# Patient Record
Sex: MALE | Race: WHITE | Employment: OTHER | URBAN - METROPOLITAN AREA
[De-identification: names, ages, dates, MRNs, and addresses within clinical notes are randomized per-mention and may not be internally consistent; named-entity substitution may affect disease eponyms.]

---

## 2020-01-01 ENCOUNTER — RECORDS - HEALTHEAST (OUTPATIENT)
Dept: LAB | Facility: CLINIC | Age: 85
End: 2020-01-01

## 2020-01-01 ENCOUNTER — TRANSFERRED RECORDS (OUTPATIENT)
Dept: HEALTH INFORMATION MANAGEMENT | Facility: CLINIC | Age: 85
End: 2020-01-01

## 2020-01-01 ENCOUNTER — DOCUMENTATION ONLY (OUTPATIENT)
Dept: OTHER | Facility: CLINIC | Age: 85
End: 2020-01-01

## 2020-01-01 ENCOUNTER — ASSISTED LIVING VISIT (OUTPATIENT)
Dept: GERIATRICS | Facility: CLINIC | Age: 85
End: 2020-01-01
Payer: COMMERCIAL

## 2020-01-01 VITALS
SYSTOLIC BLOOD PRESSURE: 165 MMHG | HEART RATE: 74 BPM | HEIGHT: 65 IN | RESPIRATION RATE: 17 BRPM | WEIGHT: 147 LBS | BODY MASS INDEX: 24.49 KG/M2 | DIASTOLIC BLOOD PRESSURE: 85 MMHG | TEMPERATURE: 98.4 F | OXYGEN SATURATION: 97 %

## 2020-01-01 VITALS
WEIGHT: 147 LBS | TEMPERATURE: 97.4 F | DIASTOLIC BLOOD PRESSURE: 85 MMHG | HEART RATE: 74 BPM | SYSTOLIC BLOOD PRESSURE: 165 MMHG | BODY MASS INDEX: 24.49 KG/M2 | RESPIRATION RATE: 17 BRPM | HEIGHT: 65 IN | OXYGEN SATURATION: 95 %

## 2020-01-01 VITALS
TEMPERATURE: 96.5 F | HEART RATE: 74 BPM | SYSTOLIC BLOOD PRESSURE: 165 MMHG | RESPIRATION RATE: 17 BRPM | DIASTOLIC BLOOD PRESSURE: 85 MMHG | OXYGEN SATURATION: 98 %

## 2020-01-01 DIAGNOSIS — C91.10 CHRONIC LYMPHOCYTIC LEUKEMIA (H): Primary | ICD-10-CM

## 2020-01-01 DIAGNOSIS — F02.818 LATE ONSET ALZHEIMER'S DISEASE WITH BEHAVIORAL DISTURBANCE (H): ICD-10-CM

## 2020-01-01 DIAGNOSIS — G30.1 LATE ONSET ALZHEIMER'S DISEASE WITH BEHAVIORAL DISTURBANCE (H): Primary | ICD-10-CM

## 2020-01-01 DIAGNOSIS — G30.1 LATE ONSET ALZHEIMER'S DISEASE WITH BEHAVIORAL DISTURBANCE (H): ICD-10-CM

## 2020-01-01 DIAGNOSIS — C91.10 CHRONIC LYMPHOCYTIC LEUKEMIA (H): ICD-10-CM

## 2020-01-01 DIAGNOSIS — N40.0 BENIGN PROSTATIC HYPERPLASIA WITHOUT LOWER URINARY TRACT SYMPTOMS: ICD-10-CM

## 2020-01-01 DIAGNOSIS — F02.818 LATE ONSET ALZHEIMER'S DISEASE WITH BEHAVIORAL DISTURBANCE (H): Primary | ICD-10-CM

## 2020-01-01 DIAGNOSIS — Z76.89 ENCOUNTER TO ESTABLISH CARE: Primary | ICD-10-CM

## 2020-01-01 DIAGNOSIS — R21 RASH: Primary | ICD-10-CM

## 2020-01-01 DIAGNOSIS — R53.81 PHYSICAL DECONDITIONING: ICD-10-CM

## 2020-01-01 DIAGNOSIS — N18.30 CKD (CHRONIC KIDNEY DISEASE) STAGE 3, GFR 30-59 ML/MIN (H): ICD-10-CM

## 2020-01-01 DIAGNOSIS — R52 PAIN: Primary | ICD-10-CM

## 2020-01-01 DIAGNOSIS — R91.1 PULMONARY NODULE: ICD-10-CM

## 2020-01-01 LAB
ALT SERPL-CCNC: 13 U/L (ref 7–40)
ANION GAP SERPL CALCULATED.3IONS-SCNC: 11 MMOL/L (ref 5–18)
ANION GAP SERPL CALCULATED.3IONS-SCNC: 11 MMOL/L (ref 5–18)
AST SERPL-CCNC: 18 U/L (ref 13–40)
BUN SERPL-MCNC: 25 MG/DL (ref 8–28)
BUN SERPL-MCNC: 25 MG/DL (ref 8–28)
CALCIUM SERPL-MCNC: 9 MG/DL (ref 8.5–10.5)
CALCIUM SERPL-MCNC: 9 MG/DL (ref 8.5–10.5)
CHLORIDE BLD-SCNC: 107 MMOL/L (ref 98–107)
CHLORIDE SERPLBLD-SCNC: 107 MMOL/L (ref 98–107)
CO2 SERPL-SCNC: 22 MMOL/L (ref 22–31)
CO2 SERPL-SCNC: 22 MMOL/L (ref 22–31)
CREAT SERPL-MCNC: 1.05 MG/DL (ref 0.7–1.3)
CREAT SERPL-MCNC: 1.05 MG/DL (ref 0.7–1.3)
CREAT SERPL-MCNC: 1.1 MG/DL (ref 0.5–1.2)
ERYTHROCYTE [DISTWIDTH] IN BLOOD BY AUTOMATED COUNT: 14.4 % (ref 11–14.5)
ERYTHROCYTE [DISTWIDTH] IN BLOOD BY AUTOMATED COUNT: 14.4 % (ref 11–14.5)
GFR SERPL CREATININE-BSD FRML MDRD: 59.8 ML/MIN/1.73M2
GFR SERPL CREATININE-BSD FRML MDRD: >60 ML/MIN/1.73M2
GFR SERPL CREATININE-BSD FRML MDRD: >60 ML/MIN/1.73M2
GLUCOSE BLD-MCNC: 115 MG/DL (ref 70–125)
GLUCOSE SERPL-MCNC: 115 MG/DL (ref 70–125)
GLUCOSE SERPL-MCNC: 148 MG/DL (ref 73–126)
HCT VFR BLD AUTO: 41.5 % (ref 40–54)
HCT VFR BLD AUTO: 41.5 % (ref 40–54)
HEMOGLOBIN: 13.3 G/DL (ref 14–18)
HGB BLD-MCNC: 13.3 G/DL (ref 14–18)
MCH RBC QN AUTO: 31.9 PG (ref 27–34)
MCH RBC QN AUTO: 31.9 PG (ref 27–34)
MCHC RBC AUTO-ENTMCNC: 32 G/DL (ref 32–36)
MCHC RBC AUTO-ENTMCNC: 32 G/DL (ref 32–36)
MCV RBC AUTO: 100 FL (ref 80–100)
MCV RBC AUTO: 100 FL (ref 80–100)
PLATELET # BLD AUTO: 181 THOU/UL (ref 140–440)
PLATELET # BLD AUTO: 181 THOU/UL (ref 140–440)
PMV BLD AUTO: 11.5 FL (ref 8.5–12.5)
POTASSIUM BLD-SCNC: 3.9 MMOL/L (ref 3.5–5)
POTASSIUM SERPL-SCNC: 3.8 MMOL/L (ref 3.5–5.1)
POTASSIUM SERPL-SCNC: 3.9 MMOL/L (ref 3.5–5)
RBC # BLD AUTO: 4.17 MILL/UL (ref 4.4–6.2)
RBC # BLD AUTO: 4.17 MILL/UL (ref 4.4–6.2)
SODIUM SERPL-SCNC: 140 MMOL/L (ref 136–145)
SODIUM SERPL-SCNC: 140 MMOL/L (ref 136–145)
WBC # BLD AUTO: 25.4 THOU/UL (ref 4–11)
WBC: 25.4 THOU/UL (ref 4–11)

## 2020-01-01 PROCEDURE — 99207 ZZC CDG-CODE CATEGORY CHANGED: CPT | Performed by: NURSE PRACTITIONER

## 2020-01-01 RX ORDER — CITALOPRAM HYDROBROMIDE 20 MG/1
TABLET ORAL
Qty: 28 TABLET | Refills: 97 | Status: SHIPPED | OUTPATIENT
Start: 2020-01-01

## 2020-01-01 RX ORDER — LORAZEPAM 0.5 MG/1
0.5 TABLET ORAL DAILY PRN
COMMUNITY
Start: 2020-01-01

## 2020-01-01 RX ORDER — DONEPEZIL HYDROCHLORIDE 10 MG/1
10 TABLET, FILM COATED ORAL DAILY
COMMUNITY
Start: 2020-01-01 | End: 2020-01-01

## 2020-01-01 RX ORDER — CITALOPRAM HYDROBROMIDE 20 MG/1
20 TABLET ORAL DAILY
COMMUNITY
Start: 2020-01-01 | End: 2020-01-01

## 2020-01-01 RX ORDER — ANORECTAL OINTMENT 15.7; .44; 24; 20.6 G/100G; G/100G; G/100G; G/100G
OINTMENT TOPICAL
Qty: 113 G | Refills: 97 | Status: SHIPPED | OUTPATIENT
Start: 2020-01-01

## 2020-01-01 RX ORDER — MEMANTINE HYDROCHLORIDE 10 MG/1
TABLET ORAL
Qty: 74 TABLET | Refills: 64 | Status: SHIPPED | OUTPATIENT
Start: 2020-01-01 | End: 2021-01-01

## 2020-01-01 RX ORDER — ACETAMINOPHEN 160 MG/5ML
1 SUSPENSION, ORAL (FINAL DOSE FORM) ORAL DAILY PRN
COMMUNITY
Start: 2020-01-01 | End: 2021-01-01

## 2020-01-01 RX ORDER — PSEUDOEPHED/ACETAMINOPH/DIPHEN 30MG-500MG
TABLET ORAL
Qty: 168 TABLET | Refills: 97 | Status: SHIPPED | OUTPATIENT
Start: 2020-01-01

## 2020-01-01 RX ORDER — DIMETHICONE 50 MG/G
CREAM TOPICAL
Qty: 114 G | Refills: 97 | Status: SHIPPED | OUTPATIENT
Start: 2020-01-01

## 2020-01-01 RX ORDER — MELOXICAM 7.5 MG/1
7.5 TABLET ORAL DAILY
COMMUNITY
Start: 2020-01-01 | End: 2020-01-01

## 2020-01-01 RX ORDER — DONEPEZIL HYDROCHLORIDE 10 MG/1
TABLET, FILM COATED ORAL
Qty: 28 TABLET | Refills: 97 | Status: SHIPPED | OUTPATIENT
Start: 2020-01-01 | End: 2021-01-01

## 2020-01-01 RX ORDER — QUETIAPINE FUMARATE 25 MG/1
TABLET, FILM COATED ORAL
Qty: 72 TABLET | Status: SHIPPED | OUTPATIENT
Start: 2020-01-01

## 2020-01-01 RX ORDER — MEMANTINE HYDROCHLORIDE 10 MG/1
10 TABLET ORAL 2 TIMES DAILY
COMMUNITY
Start: 2020-01-01 | End: 2020-01-01

## 2020-01-01 RX ORDER — VITAMIN B COMPLEX
2 TABLET ORAL DAILY
COMMUNITY
Start: 2020-01-01 | End: 2021-01-01

## 2020-01-01 RX ORDER — ACETAMINOPHEN 500 MG
500 TABLET ORAL EVERY 6 HOURS
COMMUNITY
Start: 2020-01-01 | End: 2020-01-01

## 2020-01-01 RX ORDER — CHLORAL HYDRATE 500 MG
1 CAPSULE ORAL DAILY
COMMUNITY
Start: 2020-01-01 | End: 2021-01-01

## 2020-01-01 RX ORDER — ZINC OXIDE 20 %
OINTMENT (GRAM) TOPICAL
Qty: 56.7 G | Refills: 97 | Status: SHIPPED | OUTPATIENT
Start: 2020-01-01

## 2020-01-01 RX ORDER — MELOXICAM 7.5 MG/1
TABLET ORAL
Qty: 28 TABLET | Refills: 97 | Status: SHIPPED | OUTPATIENT
Start: 2020-01-01

## 2020-01-01 RX ORDER — QUETIAPINE FUMARATE 25 MG/1
25 TABLET, FILM COATED ORAL 2 TIMES DAILY
COMMUNITY
Start: 2020-01-01 | End: 2020-01-01

## 2020-01-01 ASSESSMENT — MIFFLIN-ST. JEOR
SCORE: 1268.67
SCORE: 1268.67

## 2020-01-27 LAB
ALT SERPL-CCNC: 28 U/L (ref 7–40)
AST SERPL-CCNC: 24 U/L (ref 13–40)
CREAT SERPL-MCNC: 1.03 MG/DL (ref 0.5–1.2)
GFR SERPL CREATININE-BSD FRML MDRD: 65 ML/MIN/1.73M2
GLUCOSE SERPL-MCNC: 135 MG/DL (ref 73–126)
POTASSIUM SERPL-SCNC: 4.1 MMOL/L (ref 3.5–5.1)
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
## 2020-09-09 NOTE — LETTER
9/9/2020        [facility]  [address]  [address]  [place] [address]        [place] [facility]  [place] Medical Record Number: [id number]  Place of Service where encounter took place: [facility] ([facility]) [[id number]]  Chief Complaint   Patient presents with     RECHECK       HPI:    Angelica Tan is a 87 year old (1/1/1933), who is being seen today for an episodic care visit. HPI information obtained from: facility chart records, facility staff, patient report and [facility] chart review.     Mr. Tan was visited today due to aggressive behaviors during cares. He was heard yelling from the community room while he was in his apartment down the hallway. When entered his room, he was being transferred using the evelyn lift, yelling at the staff and attempting to hit them. He was moved to his recliner and as soon as he was put in the chair, he stopped yelling but continued to swat at the staff if they came near him.     Past Medical and Surgical History reviewed in Epic today.    MEDICATIONS:  Current Outpatient Medications   Medication Sig Dispense Refill     QUEtiapine (SEROQUEL) 25 MG tablet Take 1 tablet (25 mg) by mouth 2 times daily       acetaminophen (TYLENOL) 500 MG tablet Take 500 mg by mouth every 6 hours       citalopram (CELEXA) 20 MG tablet Take 20 mg by mouth daily       donepezil (ARICEPT) 10 MG tablet Take 10 mg by mouth daily       fish oil-omega-3 fatty acids 1000 MG capsule Take 1 capsule by mouth daily       LORazepam (ATIVAN) 0.5 MG tablet Take 0.5 mg by mouth daily as needed for anxiety       meloxicam (MOBIC) 7.5 MG tablet Take 7.5 mg by mouth daily       memantine (NAMENDA) 10 MG tablet Take 10 mg by mouth 2 times daily       Saw Palmetto, Serenoa repens, (SAW PALMETTO EXTRACT) 160 MG CAPS Take 1 capsule by mouth daily as needed       Vitamin D3 (VITAMIN D3) 25 mcg (1000 units) tablet Take 2 tablets by  mouth daily       REVIEW OF SYSTEMS:  Unobtainable secondary to cognitive impairment.     Objective:  BP (!) 165/85   Pulse 74   Temp 96.5  F (35.8  C)   Resp 17   SpO2 98%   Exam:  GENERAL APPEARANCE:  Alert, irritable and distressed  RESP: no respiratory distress, patient is on room air  CV: . Edema none in bilateral lower extremities.  M/S:   Gait and station: evelyn lift.   PSYCH:  insight, judgement, and memory impaired    Labs:   CBC RESULTS:   Recent Labs   Lab Test 09/01/20   WBC 25.4*   RBC 4.17*   HGB 13.3*   HCT 41.5      MCH 31.9   MCHC 32.0   RDW 14.4          Last Basic Metabolic Panel:  Recent Labs   Lab Test 09/01/20      POTASSIUM 3.9   CHLORIDE 107   KAVITA 9.0   CO2 22   BUN 25   CR 1.05        ASSESSMENT/PLAN:  Advanced dementia  Suspect that he is experiencing delirium given that he recently moved into the memory care from living with his wife previously. He required seroquel and IM haldol while seen in the ED earlier this month for agitation. Given that he is distressed and combative with staff, will start scheduled seroquel and check in with him in 2-3 weeks. Attempted to call wife about the medication changes but there was no answer. DHS has been in touch with her frequently and will let her know of these changes.   --start seroquel 25 mg PO BID   --citalopram 20 mg daily.   --continue with memory care assisted living for assistance with cares, meals and medications.     Electronically signed by:  COREY Blake CNP         Sincerely,        COREY Blake CNP

## 2020-09-10 PROBLEM — N39.41 URGE INCONTINENCE OF URINE: Status: ACTIVE | Noted: 2020-01-01

## 2020-09-10 PROBLEM — C91.10 CHRONIC LYMPHOCYTIC LEUKEMIA (H): Status: ACTIVE | Noted: 2019-07-03

## 2020-09-10 PROBLEM — R35.1 NOCTURIA: Status: ACTIVE | Noted: 2020-01-01

## 2020-09-10 PROBLEM — J30.0 VASOMOTOR RHINITIS: Status: ACTIVE | Noted: 2018-03-12

## 2020-09-10 PROBLEM — I10 ESSENTIAL HYPERTENSION: Status: ACTIVE | Noted: 2018-06-19

## 2020-09-10 PROBLEM — F03.90 DEMENTIA WITHOUT BEHAVIORAL DISTURBANCE, UNSPECIFIED DEMENTIA TYPE: Status: ACTIVE | Noted: 2018-06-12

## 2020-09-10 PROBLEM — I45.2 RIGHT BUNDLE BRANCH BLOCK WITH LEFT ANTERIOR FASCICULAR BLOCK: Status: ACTIVE | Noted: 2020-01-01

## 2020-09-10 PROBLEM — N40.1 BENIGN PROSTATIC HYPERPLASIA WITH URINARY FREQUENCY: Status: ACTIVE | Noted: 2019-07-03

## 2020-09-10 PROBLEM — F41.1 GENERALIZED ANXIETY DISORDER: Status: ACTIVE | Noted: 2018-11-26

## 2020-09-10 PROBLEM — R00.1 BRADYCARDIA: Status: ACTIVE | Noted: 2020-01-01

## 2020-09-10 PROBLEM — E55.9 VITAMIN D DEFICIENCY: Status: ACTIVE | Noted: 2019-07-03

## 2020-09-10 PROBLEM — R35.0 BENIGN PROSTATIC HYPERPLASIA WITH URINARY FREQUENCY: Status: ACTIVE | Noted: 2019-07-03

## 2020-09-10 PROBLEM — R41.3 MEMORY LOSS: Status: ACTIVE | Noted: 2020-01-01

## 2020-09-10 NOTE — PROGRESS NOTES
[place] [facility]  [place] Medical Record Number: [id number]  Place of Service where encounter took place: [facility] ([facility]) [[id number]]  Chief Complaint   Patient presents with     RECHECK       HPI:    Angelica Tan is a 87 year old (1/1/1933), who is being seen today for an episodic care visit. HPI information obtained from: facility chart records, facility staff, patient report and [facility] chart review.     Mr. Tan was visited today due to aggressive behaviors during cares. He was heard yelling from the community room while he was in his apartment down the hallway. When entered his room, he was being transferred using the evelyn lift, yelling at the staff and attempting to hit them. He was moved to his recliner and as soon as he was put in the chair, he stopped yelling but continued to swat at the staff if they came near him.     Past Medical and Surgical History reviewed in Epic today.    MEDICATIONS:  Current Outpatient Medications   Medication Sig Dispense Refill     QUEtiapine (SEROQUEL) 25 MG tablet Take 1 tablet (25 mg) by mouth 2 times daily       acetaminophen (TYLENOL) 500 MG tablet Take 500 mg by mouth every 6 hours       citalopram (CELEXA) 20 MG tablet Take 20 mg by mouth daily       donepezil (ARICEPT) 10 MG tablet Take 10 mg by mouth daily       fish oil-omega-3 fatty acids 1000 MG capsule Take 1 capsule by mouth daily       LORazepam (ATIVAN) 0.5 MG tablet Take 0.5 mg by mouth daily as needed for anxiety       meloxicam (MOBIC) 7.5 MG tablet Take 7.5 mg by mouth daily       memantine (NAMENDA) 10 MG tablet Take 10 mg by mouth 2 times daily       Saw Palmetto, Serenoa repens, (SAW PALMETTO EXTRACT) 160 MG CAPS Take 1 capsule by mouth daily as needed       Vitamin D3 (VITAMIN D3) 25 mcg (1000 units) tablet Take 2 tablets by mouth daily       REVIEW OF SYSTEMS:  Unobtainable secondary to cognitive impairment.     Objective:  BP (!) 165/85    Pulse 74   Temp 96.5  F (35.8  C)   Resp 17   SpO2 98%   Exam:  GENERAL APPEARANCE:  Alert, irritable and distressed  RESP: no respiratory distress, patient is on room air  CV: . Edema none in bilateral lower extremities.  M/S:   Gait and station: evelyn lift.   PSYCH:  insight, judgement, and memory impaired    Labs:   CBC RESULTS:   Recent Labs   Lab Test 09/01/20   WBC 25.4*   RBC 4.17*   HGB 13.3*   HCT 41.5      MCH 31.9   MCHC 32.0   RDW 14.4          Last Basic Metabolic Panel:  Recent Labs   Lab Test 09/01/20      POTASSIUM 3.9   CHLORIDE 107   KAVITA 9.0   CO2 22   BUN 25   CR 1.05        ASSESSMENT/PLAN:  Advanced dementia  Suspect that he is experiencing delirium given that he recently moved into the memory care from living with his wife previously. He required seroquel and IM haldol while seen in the ED earlier this month for agitation. Given that he is distressed and combative with staff, will start scheduled seroquel and check in with him in 2-3 weeks. Attempted to call wife about the medication changes but there was no answer. DHS has been in touch with her frequently and will let her know of these changes.   --start seroquel 25 mg PO BID   --citalopram 20 mg daily.   --continue with memory care assisted living for assistance with cares, meals and medications.     Electronically signed by:  COREY Blake CNP

## 2020-09-24 NOTE — PROGRESS NOTES
"[place] [facility]  [place] Medical Record Number: [id number]  Place of Service where encounter took place: [facility] [place] ([facility]) [[id number]]  Chief Complaint   Patient presents with     RECHECK       HPI:    Angelica Tan is a 87 year old (1/1/1933), who is being seen today for an episodic care visit. HPI information obtained from: facility chart records, facility staff, patient report and [facility] chart review.     Mr. Tan was visited today while in his recliner. Unable to obtain hx due to advanced dementia but he smiles throughout the visit. Staff reports that since he has been on the seroqeul, he has been more cooperative with cares and yells less frequently.     Past Medical and Surgical History reviewed in Epic today.    MEDICATIONS:  Current Outpatient Medications   Medication Sig Dispense Refill     acetaminophen (TYLENOL) 500 MG tablet Take 500 mg by mouth every 6 hours       CALMOSEPTINE 0.44-20.6 % OINT ointment APPLY TOPICALLY TO SORES TWICE DAILY UNTIL HEALED 113 g 97     citalopram (CELEXA) 20 MG tablet Take 20 mg by mouth daily       donepezil (ARICEPT) 10 MG tablet Take 10 mg by mouth daily       fish oil-omega-3 fatty acids 1000 MG capsule Take 1 capsule by mouth daily       LORazepam (ATIVAN) 0.5 MG tablet Take 0.5 mg by mouth daily as needed for anxiety       meloxicam (MOBIC) 7.5 MG tablet Take 7.5 mg by mouth daily       memantine (NAMENDA) 10 MG tablet Take 10 mg by mouth 2 times daily       QUEtiapine (SEROQUEL) 25 MG tablet Take 1 tablet (25 mg) by mouth 2 times daily       Saw Palmetto, Serenoa repens, (SAW PALMETTO EXTRACT) 160 MG CAPS Take 1 capsule by mouth daily as needed       Vitamin D3 (VITAMIN D3) 25 mcg (1000 units) tablet Take 2 tablets by mouth daily       REVIEW OF SYSTEMS:  Unobtainable secondary to cognitive impairment.     Objective:  BP (!) 165/85   Pulse 74   Temp 97.4  F (36.3  C)   Resp 17   Ht 1.651 m (5' 5\")   " Wt 66.7 kg (147 lb)   SpO2 95%   BMI 24.46 kg/m    Exam:  GENERAL APPEARANCE:  Alert, calm and pleasant.   RESP: no respiratory distress, patient is on room air  CV: . Edema none in bilateral lower extremities.  M/S:   Gait and station: evelyn lift. requires assist with all cares.   PSYCH:  insight, judgement, and memory impaired      ASSESSMENT/PLAN:  Advanced dementia  Previously yelling and combative during cares but since seroquel was started, this has improved. He looks comfortable and calm in the recliner today so will continue with the current dose of seroquel as ordered.   --continue with seroquel 25 mg PO BID   --citalopram 20 mg daily.   --continue with memory care assisted living for assistance with cares, meals and medications.     Electronically signed by:  COREY Blake CNP

## 2020-09-25 NOTE — LETTER
9/25/2020        [facility]  [address]  [facility] [address]        [place] [facility]  [place] Medical Record Number: [id number]  Place of Service where encounter took place: [facility] [place] ([facility]) [[id number]]  Chief Complaint   Patient presents with     RECHECK       HPI:    Angelica Tan is a 87 year old (1/1/1933), who is being seen today for an episodic care visit. HPI information obtained from: facility chart records, facility staff, patient report and [facility] chart review.     Mr. Tan was visited today while in his recliner. Unable to obtain hx due to advanced dementia but he smiles throughout the visit. Staff reports that since he has been on the seroqeul, he has been more cooperative with cares and yells less frequently.     Past Medical and Surgical History reviewed in Epic today.    MEDICATIONS:  Current Outpatient Medications   Medication Sig Dispense Refill     acetaminophen (TYLENOL) 500 MG tablet Take 500 mg by mouth every 6 hours       CALMOSEPTINE 0.44-20.6 % OINT ointment APPLY TOPICALLY TO SORES TWICE DAILY UNTIL HEALED 113 g 97     citalopram (CELEXA) 20 MG tablet Take 20 mg by mouth daily       donepezil (ARICEPT) 10 MG tablet Take 10 mg by mouth daily       fish oil-omega-3 fatty acids 1000 MG capsule Take 1 capsule by mouth daily       LORazepam (ATIVAN) 0.5 MG tablet Take 0.5 mg by mouth daily as needed for anxiety       meloxicam (MOBIC) 7.5 MG tablet Take 7.5 mg by mouth daily       memantine (NAMENDA) 10 MG tablet Take 10 mg by mouth 2 times daily       QUEtiapine (SEROQUEL) 25 MG tablet Take 1 tablet (25 mg) by mouth 2 times daily       Saw Palmetto, Serenoa repens, (SAW PALMETTO EXTRACT) 160 MG CAPS Take 1 capsule by mouth daily as needed       Vitamin D3 (VITAMIN D3) 25 mcg (1000 units) tablet Take 2 tablets by mouth daily       REVIEW OF SYSTEMS:  Unobtainable secondary to  "cognitive impairment.     Objective:  BP (!) 165/85   Pulse 74   Temp 97.4  F (36.3  C)   Resp 17   Ht 1.651 m (5' 5\")   Wt 66.7 kg (147 lb)   SpO2 95%   BMI 24.46 kg/m    Exam:  GENERAL APPEARANCE:  Alert, calm and pleasant.   RESP: no respiratory distress, patient is on room air  CV: . Edema none in bilateral lower extremities.  M/S:   Gait and station: evelyn lift. requires assist with all cares.   PSYCH:  insight, judgement, and memory impaired      ASSESSMENT/PLAN:  Advanced dementia  Previously yelling and combative during cares but since seroquel was started, this has improved. He looks comfortable and calm in the recliner today so will continue with the current dose of seroquel as ordered.   --continue with seroquel 25 mg PO BID   --citalopram 20 mg daily.   --continue with memory care assisted living for assistance with cares, meals and medications.     Electronically signed by:  COREY Blake CNP         Sincerely,        COREY Blake CNP    "

## 2021-01-01 ENCOUNTER — RECORDS - HEALTHEAST (OUTPATIENT)
Dept: LAB | Facility: CLINIC | Age: 86
End: 2021-01-01

## 2021-01-01 ENCOUNTER — DOCUMENTATION ONLY (OUTPATIENT)
Dept: GERIATRICS | Facility: CLINIC | Age: 86
End: 2021-01-01

## 2021-01-01 ENCOUNTER — ASSISTED LIVING VISIT (OUTPATIENT)
Dept: GERIATRICS | Facility: CLINIC | Age: 86
End: 2021-01-01
Payer: COMMERCIAL

## 2021-01-01 VITALS
TEMPERATURE: 97.4 F | DIASTOLIC BLOOD PRESSURE: 98 MMHG | HEIGHT: 65 IN | RESPIRATION RATE: 14 BRPM | WEIGHT: 127.8 LBS | SYSTOLIC BLOOD PRESSURE: 146 MMHG | HEART RATE: 77 BPM | OXYGEN SATURATION: 97 % | BODY MASS INDEX: 21.29 KG/M2

## 2021-01-01 VITALS — OXYGEN SATURATION: 97 % | BODY MASS INDEX: 21.59 KG/M2 | HEIGHT: 65 IN | TEMPERATURE: 96.9 F | WEIGHT: 129.6 LBS

## 2021-01-01 DIAGNOSIS — Z51.5 HOSPICE CARE PATIENT: Primary | ICD-10-CM

## 2021-01-01 DIAGNOSIS — F02.818 LATE ONSET ALZHEIMER'S DISEASE WITH BEHAVIORAL DISTURBANCE (H): ICD-10-CM

## 2021-01-01 DIAGNOSIS — C91.10 CHRONIC LYMPHOCYTIC LEUKEMIA (H): ICD-10-CM

## 2021-01-01 DIAGNOSIS — G30.1 LATE ONSET ALZHEIMER'S DISEASE WITH BEHAVIORAL DISTURBANCE (H): ICD-10-CM

## 2021-01-01 LAB
SARS-COV-2 PCR COMMENT: NORMAL
SARS-COV-2 RNA SPEC QL NAA+PROBE: NEGATIVE
SARS-COV-2 VIRUS SPECIMEN SOURCE: NORMAL

## 2021-01-01 ASSESSMENT — MIFFLIN-ST. JEOR
SCORE: 1184.74
SCORE: 1176.58

## 2021-02-04 NOTE — PROGRESS NOTES
"[place] [facility]  [place] Medical Record Number: [id number]  Place of Service where encounter took place: [facility] [place] ([facility]) [[id number]]  Chief Complaint   Patient presents with     RECHECK       HPI:    Angelica Tan is a 87 year old (1/1/1933), who is being seen today for an episodic care visit. HPI information obtained from: facility chart records, facility staff, patient report and [facility] chart review.     Mr. Tan was visited today while in his wheelchair in the community area. He is unable to provide hx due to advanced dementia but states he has pain \"down there\", could not elaborate.     Staff does not have any concerns at this time. Spoke with hospice RN last week who reported that Angelica just re-certified for services.     Past Medical and Surgical History reviewed in Epic today.    MEDICATIONS:  Current Outpatient Medications   Medication Sig Dispense Refill     ACETAMINOPHEN EXTRA STRENGTH 500 MG tablet TAKE TWO TABLETS (1000MG) BY MOUTH THREE TIMES DAILY;AND TAKE TWO TABLETS (1000MG) BY MOUTH DAILY AS NEEDED FOR PAIN 168 tablet 97     CALMOSEPTINE 0.44-20.6 % OINT ointment APPLY TOPICALLY TO SORES TWICE DAILY UNTIL HEALED 113 g 97     citalopram (CELEXA) 20 MG tablet TAKE 1 TABLET BY MOUTH ONCE DAILY 28 tablet 97     LORazepam (ATIVAN) 0.5 MG tablet Take 0.5 mg by mouth daily as needed for anxiety       meloxicam (MOBIC) 7.5 MG tablet TAKE 1 TABLET BY MOUTH ONCE DAILY 28 tablet 97     QUEtiapine (SEROQUEL) 25 MG tablet TAKE 1 TABLET BY MOUTH TWICE DAILY 72 tablet PRN     SECURA DIMETHICONE PROTECTANT 5 % CREA APPLY TOPICALLY TO WOUND BED / LUIS ANGEL-WOUND DAILY WHEN WOUND BED IS  g 97     zinc oxide (DESITIN) 20 % external ointment APPLY TOPICALLY TO WOUND BED WHEN WOUND BED IS MOIST / OPEN DAILY 56.7 g 97     REVIEW OF SYSTEMS:  Unobtainable secondary to cognitive impairment.     Objective:  BP (!) 146/98   Pulse 77   Temp 97.4  F (36.3 " " C)   Resp 14   Ht 1.651 m (5' 5\")   Wt 58 kg (127 lb 12.8 oz)   SpO2 97%   BMI 21.27 kg/m    Exam:  GENERAL APPEARANCE:  Alert, calm and pleasant.   RESP: no respiratory distress, lungs are clear,patient is on room air  CV: regular rate, Edema none in bilateral lower extremities.  M/S:   Gait and station: evelyn lift. requires assist with all cares.   PSYCH:  insight, judgement, and memory impaired      ASSESSMENT/PLAN:  dementia  Previously yelling and combative during cares upon moving into the facility but he is now calm and pleasant.   --continue with seroquel 25 mg PO BID   --citalopram 20 mg daily.   --continue with memory care assisted living for assistance with cares, meals and medications.     CLL  Hospice care patient  Previously followed by [facility], now on hospice care.   --hospice care for comfort.   --encourage staff to use pain meds for comfort as he complained of pain today.     Electronically signed by:  COREY Blake CNP         "

## 2021-02-05 NOTE — LETTER
"    2/5/2021        RE: Angelica Tan  [facility]  [address] [place] [address] [address]  [address]  [facility] [address]        [place] [facility]  [place] Medical Record Number: [id number]  Place of Service where encounter took place: [facility] [place] ([facility]) [[id number]]  Chief Complaint   Patient presents with     RECHECK       HPI:    Angelica Tan is a 87 year old (1/1/1933), who is being seen today for an episodic care visit. HPI information obtained from: facility chart records, facility staff, patient report and [facility] chart review.     Mr. Tan was visited today while in his wheelchair in the community area. He is unable to provide hx due to advanced dementia but states he has pain \"down there\", could not elaborate.     Staff does not have any concerns at this time. Spoke with hospice RN last week who reported that Angelica just re-certified for services.     Past Medical and Surgical History reviewed in Epic today.    MEDICATIONS:  Current Outpatient Medications   Medication Sig Dispense Refill     ACETAMINOPHEN EXTRA STRENGTH 500 MG tablet TAKE TWO TABLETS (1000MG) BY MOUTH THREE TIMES DAILY;AND TAKE TWO TABLETS (1000MG) BY MOUTH DAILY AS NEEDED FOR PAIN 168 tablet 97     CALMOSEPTINE 0.44-20.6 % OINT ointment APPLY TOPICALLY TO SORES TWICE DAILY UNTIL HEALED 113 g 97     citalopram (CELEXA) 20 MG tablet TAKE 1 TABLET BY MOUTH ONCE DAILY 28 tablet 97     LORazepam (ATIVAN) 0.5 MG tablet Take 0.5 mg by mouth daily as needed for anxiety       meloxicam (MOBIC) 7.5 MG tablet TAKE 1 TABLET BY MOUTH ONCE DAILY 28 tablet 97     QUEtiapine (SEROQUEL) 25 MG tablet TAKE 1 TABLET BY MOUTH TWICE DAILY 72 tablet PRN     SECURA DIMETHICONE PROTECTANT 5 % CREA APPLY TOPICALLY TO WOUND BED / LUIS ANGEL-WOUND DAILY WHEN WOUND BED IS  g 97     zinc oxide (DESITIN) 20 % external ointment APPLY TOPICALLY TO WOUND BED WHEN WOUND BED IS MOIST / OPEN DAILY 56.7 g 97     REVIEW OF " "SYSTEMS:  Unobtainable secondary to cognitive impairment.     Objective:  BP (!) 146/98   Pulse 77   Temp 97.4  F (36.3  C)   Resp 14   Ht 1.651 m (5' 5\")   Wt 58 kg (127 lb 12.8 oz)   SpO2 97%   BMI 21.27 kg/m    Exam:  GENERAL APPEARANCE:  Alert, calm and pleasant.   RESP: no respiratory distress, lungs are clear,patient is on room air  CV: regular rate, Edema none in bilateral lower extremities.  M/S:   Gait and station: evelyn lift. requires assist with all cares.   PSYCH:  insight, judgement, and memory impaired      ASSESSMENT/PLAN:  dementia  Previously yelling and combative during cares upon moving into the facility but he is now calm and pleasant.   --continue with seroquel 25 mg PO BID   --citalopram 20 mg daily.   --continue with memory care assisted living for assistance with cares, meals and medications.     CLL  Hospice care patient  Previously followed by [facility], now on hospice care.   --hospice care for comfort.   --encourage staff to use pain meds for comfort as he complained of pain today.     Electronically signed by:  COREY Blake CNP               Sincerely,        COREY Blake CNP    "

## 2021-06-28 NOTE — PROGRESS NOTES
[place] [facility]  [place] Medical Record Number: [id number]  Place of Service where encounter took place: [facility] ([facility]) [[id number]]  Chief Complaint   Patient presents with     RECHECK       HPI:    Angelica Tan is a 87 year old (1/1/1933), who is being seen today for an episodic care visit. HPI information obtained from: facility chart records, facility staff, patient report and [facility] chart review.     Mr. Tan was visited today while in his room, lying in bed. He is less alert and more lethargic than baseline where he is generally up in the wheelchair and talkative. Hx is limited but he denies pain.     Per staff, he was more lethargic beginning yesterday. He was sliding out of the wheelchair. Appetite has decreased. He appeared to be in more pain today when attempting to use the lift so he was left in the bed.     Past Medical and Surgical History reviewed in Epic today.    MEDICATIONS:  Current Outpatient Medications   Medication Sig Dispense Refill     ACETAMINOPHEN EXTRA STRENGTH 500 MG tablet TAKE TWO TABLETS (1000MG) BY MOUTH THREE TIMES DAILY;AND TAKE TWO TABLETS (1000MG) BY MOUTH DAILY AS NEEDED FOR PAIN 168 tablet 97     CALMOSEPTINE 0.44-20.6 % OINT ointment APPLY TOPICALLY TO SORES TWICE DAILY UNTIL HEALED 113 g 97     citalopram (CELEXA) 20 MG tablet TAKE 1 TABLET BY MOUTH ONCE DAILY 28 tablet 97     LORazepam (ATIVAN) 0.5 MG tablet Take 0.5 mg by mouth daily as needed for anxiety       meloxicam (MOBIC) 7.5 MG tablet TAKE 1 TABLET BY MOUTH ONCE DAILY 28 tablet 97     QUEtiapine (SEROQUEL) 25 MG tablet TAKE 1 TABLET BY MOUTH TWICE DAILY 72 tablet PRN     SECURA DIMETHICONE PROTECTANT 5 % CREA APPLY TOPICALLY TO WOUND BED / LUIS ANGEL-WOUND DAILY WHEN WOUND BED IS  g 97     zinc oxide (DESITIN) 20 % external ointment APPLY TOPICALLY TO WOUND BED WHEN WOUND BED IS MOIST / OPEN DAILY 56.7 g 97     REVIEW OF SYSTEMS:  Unobtainable secondary to  "cognitive impairment.     Objective:  Temp 96.9  F (36.1  C)   Ht 1.651 m (5' 5\")   Wt 58.8 kg (129 lb 9.6 oz)   SpO2 97%   BMI 21.57 kg/m    Exam:  GENERAL APPEARANCE:  lethargic  RESP: no respiratory distress, lungs are clear,patient is on room air. Apneic periods to breathing.   CV: regular rate, Edema none in bilateral lower extremities.  M/S:   Gait and station: evelyn lift. requires assist with all cares.   PSYCH:  insight, judgement, and memory impaired      ASSESSMENT/PLAN:  dementia  Previously yelling and combative during cares upon moving into the facility but he has been calm and pleasant after seroquel was initiated. He is in bed today due to pain and lethargy, have asked staff to turn and reposition for comfort. Appears that he is starting to transition at he had some apneic periods during our visit.   --notified nursing of change of condition, hope they can get out to see him today.   --continue with seroquel 25 mg PO BID   --citalopram 20 mg daily.   --continue with memory care assisted living for assistance with cares, meals and medications.     CLL  Hospice care patient  Previously followed by [facility], now on hospice care. See above for change of condition.   --hospice care for comfort.   --encourage staff to use pain meds for comfort as he complained of pain today.     Electronically signed by:  CORYE Blake CNP           "

## 2021-06-28 NOTE — LETTER
6/28/2021        [facility]  [address] [address]        [place] [facility]  [place] Medical Record Number: [id number]  Place of Service where encounter took place: [facility] [place] ([facility]) [[id number]]  Chief Complaint   Patient presents with     RECHECK       HPI:    Angelica Tan is a 87 year old (1/1/1933), who is being seen today for an episodic care visit. HPI information obtained from: facility chart records, facility staff, patient report and [facility] chart review.     Mr. Tan was visited today while in his room, lying in bed. He is less alert and more lethargic than baseline where he is generally up in the wheelchair and talkative. Hx is limited but he denies pain.     Per staff, he was more lethargic beginning yesterday. He was sliding out of the wheelchair. Appetite has decreased. He appeared to be in more pain today when attempting to use the lift so he was left in the bed.     Past Medical and Surgical History reviewed in Epic today.    MEDICATIONS:  Current Outpatient Medications   Medication Sig Dispense Refill     ACETAMINOPHEN EXTRA STRENGTH 500 MG tablet TAKE TWO TABLETS (1000MG) BY MOUTH THREE TIMES DAILY;AND TAKE TWO TABLETS (1000MG) BY MOUTH DAILY AS NEEDED FOR PAIN 168 tablet 97     CALMOSEPTINE 0.44-20.6 % OINT ointment APPLY TOPICALLY TO SORES TWICE DAILY UNTIL HEALED 113 g 97     citalopram (CELEXA) 20 MG tablet TAKE 1 TABLET BY MOUTH ONCE DAILY 28 tablet 97     LORazepam (ATIVAN) 0.5 MG tablet Take 0.5 mg by mouth daily as needed for anxiety       meloxicam (MOBIC) 7.5 MG tablet TAKE 1 TABLET BY MOUTH ONCE DAILY 28 tablet 97     QUEtiapine (SEROQUEL) 25 MG tablet TAKE 1 TABLET BY MOUTH TWICE DAILY 72 tablet PRN     SECURA DIMETHICONE PROTECTANT 5 % CREA APPLY TOPICALLY TO WOUND BED / LUIS ANGEL-WOUND DAILY WHEN WOUND BED IS  g 97     zinc oxide (DESITIN) 20 % external ointment APPLY TOPICALLY TO  "WOUND BED WHEN WOUND BED IS MOIST / OPEN DAILY 56.7 g 97     REVIEW OF SYSTEMS:  Unobtainable secondary to cognitive impairment.     Objective:  Temp 96.9  F (36.1  C)   Ht 1.651 m (5' 5\")   Wt 58.8 kg (129 lb 9.6 oz)   SpO2 97%   BMI 21.57 kg/m    Exam:  GENERAL APPEARANCE:  lethargic  RESP: no respiratory distress, lungs are clear,patient is on room air. Apneic periods to breathing.   CV: regular rate, Edema none in bilateral lower extremities.  M/S:   Gait and station: evelyn lift. requires assist with all cares.   PSYCH:  insight, judgement, and memory impaired      ASSESSMENT/PLAN:  dementia  Previously yelling and combative during cares upon moving into the facility but he has been calm and pleasant after seroquel was initiated. He is in bed today due to pain and lethargy, have asked staff to turn and reposition for comfort. Appears that he is starting to transition at he had some apneic periods during our visit.   --notified nursing of change of condition, hope they can get out to see him today.   --continue with seroquel 25 mg PO BID   --citalopram 20 mg daily.   --continue with memory care assisted living for assistance with cares, meals and medications.     CLL  Hospice care patient  Previously followed by [facility], now on hospice care. See above for change of condition.   --hospice care for comfort.   --encourage staff to use pain meds for comfort as he complained of pain today.     Electronically signed by:  COREY Blake CNP                 Sincerely,        COREY Blake CNP      "

## 2021-07-04 NOTE — PROGRESS NOTES
Notifying care team that Angelica passed away 7/4/21 at 1355 with son present.  Peaceful death reported.